# Patient Record
Sex: FEMALE | Race: WHITE | NOT HISPANIC OR LATINO | URBAN - METROPOLITAN AREA
[De-identification: names, ages, dates, MRNs, and addresses within clinical notes are randomized per-mention and may not be internally consistent; named-entity substitution may affect disease eponyms.]

---

## 2018-10-24 ENCOUNTER — OFFICE VISIT - HEALTHEAST (OUTPATIENT)
Dept: FAMILY MEDICINE | Facility: CLINIC | Age: 43
End: 2018-10-24

## 2018-10-24 DIAGNOSIS — R68.89 FLU-LIKE SYMPTOMS: ICD-10-CM

## 2018-10-24 LAB
DEPRECATED S PYO AG THROAT QL EIA: NORMAL
FLUAV AG SPEC QL IA: NORMAL
FLUBV AG SPEC QL IA: NORMAL

## 2018-10-24 RX ORDER — ALBUTEROL SULFATE 90 UG/1
2 AEROSOL, METERED RESPIRATORY (INHALATION) EVERY 6 HOURS PRN
Qty: 1 EACH | Refills: 0 | Status: SHIPPED | OUTPATIENT
Start: 2018-10-24

## 2018-10-25 LAB — GROUP A STREP BY PCR: NORMAL

## 2018-10-26 ENCOUNTER — OFFICE VISIT - HEALTHEAST (OUTPATIENT)
Dept: FAMILY MEDICINE | Facility: CLINIC | Age: 43
End: 2018-10-26

## 2018-10-26 DIAGNOSIS — J01.00 ACUTE MAXILLARY SINUSITIS, RECURRENCE NOT SPECIFIED: ICD-10-CM

## 2021-06-02 VITALS — WEIGHT: 175 LBS

## 2021-06-02 VITALS — WEIGHT: 183 LBS

## 2021-06-21 NOTE — PROGRESS NOTES
Subjective:      Patient ID: Adriana Paredes is a 42 y.o. female.    Chief Complaint:    HPI  Adriana Paredes is a 42 y.o. female who returns to clinic today from being seen 2 days previous.  Please see the previous note in epic.  In summary she had a viral illness where she had negative strep testing and negative influenza screening.  The symptoms and congestion have now coalesced to a more problematic facial and ear pain she is now complaining of pain in the bilateral ears as well as facial pain and pressure that is made worse with dependency.  She has also had some postnasal drainage down the back of the throat.  She has not had any epistaxis fever chills night sweats nausea or vomiting.  She has not tried treatment for this over-the-counter.      No past medical history on file.    No past surgical history on file.    No family history on file.    Social History   Substance Use Topics     Smoking status: Never Smoker     Smokeless tobacco: Never Used     Alcohol use Not on file       Review of Systems  As above in [facility], otherwise negative.    Objective:     /65  Pulse 78  Temp 98  F (36.7  C) (Oral)   Wt 175 lb (79.4 kg)  SpO2 98%  Breastfeeding? No    Physical Exam  General: Patient is resting comfortably no acute distress is afebrile  HEENT: Head is normocephalic atraumatic   Frontal and maxillary sinuses are tender to percussion  eyes are PERRL   EOMI sclera anicteric   TMs are clear bilaterally  Throat is with mild posterior pharyngeal wall exudate but no erythema  No cervical lymphadenopathy present  Lungs: Clear to auscultation bilaterally  Heart: Regular rate and rhythm  Skin: Without rash non-diaphoretic      Assessment:     Procedures    The encounter diagnosis was Acute maxillary sinusitis, recurrence not specified.    Plan:     1. Acute maxillary sinusitis, recurrence not specified  doxycycline (MONODOX) 100 MG capsule    fluticasone (FLONASE ALLERGY RELIEF) 50 mcg/actuation nasal spray          Over-the-counter nasal steroid spray, follow packaging directions  Over-the-counter Mucinex, follow packaging directions  Hot packs 3 times per day to the forehead and face over the tender sinuses  Distal saline salt water or saline irrigation with Alda Philippe  Antibiotic as written below.  Risks and benefits of the medication were gone over.  Indication for return to see urgent care or family practice provider for reevaluation and treatment.    As a result of our visit today, here are the action plans for you:    1. Medication(s) to stop: There are no discontinued medications.    2. Medication(s) to start or change:   Medications Ordered   Medications     doxycycline (MONODOX) 100 MG capsule     Sig: Take 1 capsule (100 mg total) by mouth 2 (two) times a day for 10 days.     Dispense:  20 capsule     Refill:  0     fluticasone (FLONASE ALLERGY RELIEF) 50 mcg/actuation nasal spray     Si spray into each nostril daily.     Dispense:  16 g     Refill:  0

## 2021-06-26 NOTE — PROGRESS NOTES
Progress Notes by Soham Joseph PA-C at 10/24/2018  3:00 PM     Author: Soham Joseph PA-C Service: -- Author Type: Physician Assistant    Filed: 10/24/2018  4:02 PM Encounter Date: 10/24/2018 Status: Signed    : Soham Joseph PA-C (Physician Assistant)       Subjective:      Patient ID: Adriana Dawkins is a 42 y.o. female.    Chief Complaint:    HPI  Adriana Dawkins is a 42 y.o. female who presents today complaining of 1 day acute onset of Influenza like illness symptoms to include fever, dry nonproductive cough, sore throat, odynophagia, rhinorrhea, myalgias, arthralgias, headache and fatigue.      She states she had acute onset of all the above symptoms.    She has had a seasonal influenza immunization on 10/1/2018.    Last dose of antipyretic.  None.  Temperature in the office is currently 98.0    Anorexia: NO    She is taking fluids and is micturating.    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      No past medical history on file.    No past surgical history on file.    No family history on file.    Social History   Substance Use Topics   ? Smoking status: Never Smoker   ? Smokeless tobacco: Never Used   ? Alcohol use None       Review of Systems  As above in HPI, otherwise negative.    Objective:     /78 (Patient Site: Right Arm, Patient Position: Sitting, Cuff Size: Adult Regular)  Pulse 63  Temp 98  F (36.7  C) (Oral)   Resp 16  Wt 183 lb (83 kg)  SpO2 98%    Physical Exam  General: Patient is resting comfortably no acute distress is afebrile  HEENT: Head is normocephalic atraumatic   eyes are PERRL EOMI sclera anicteric   TMs are clear bilaterally  Throat is with mild pharyngeal wall erythema and no exudate  No cervical lymphadenopathy present  LUNGS: Clear to auscultation bilaterally  HEART: Regular rate and rhythm  Skin: Without rash non-diaphoretic    Lab:  Recent Results (from the past 24 hour(s))   Influenza A/B Rapid Test- Nasal Swab   Result Value Ref  Range    Influenza  A, Rapid Antigen No Influenza A antigen detected No Influenza A antigen detected    Influenza B, Rapid Antigen No Influenza B antigen detected No Influenza B antigen detected   Rapid Strep A Screen-Throat   Result Value Ref Range    Rapid Strep A Antigen No Group A Strep detected, presumptive negative No Group A Strep detected, presumptive negative       Assessment:     Procedures    The encounter diagnosis was Flu-like symptoms.    Plan:     1. Flu-like symptoms  levothyroxine (SYNTHROID, LEVOTHROID) 37.5 mcg    Influenza A/B Rapid Test- Nasal Swab    Rapid Strep A Screen-Throat    Group A Strep, RNA Direct Detection, Throat    albuterol (PROAIR HFA;PROVENTIL HFA;VENTOLIN HFA) 90 mcg/actuation inhaler    inhalational spacing device Spcr    benzocaine-menthol (CEPACOL) 15-3.6 mg         Patient Instructions     Your rapid influenza test came back negative for flu. You are contagious until your fever is gone for 24 hours. Maintain good hand hygiene, cover your cough, and limit contact to prevent spreading the illness. Symptoms typically last 1-2 weeks.    Symptom management:  - Drink plenty of fluids and allow for plenty of rest  - Use tylenol or ibuprofen every 4-6 hours for fever/discomfort    Reasons to be seen immediately for re-evaluation:  - Have trouble breathing or are short of breath  - Feel pain or pressure in your chest or belly  - Get suddenly dizzy  - Feel confused  - Have severe vomiting    If no symptom improvement in 1 week, follow-up with your primary care provider.    As a result of our visit today, here are the action plans for you:    1. Medication(s) to stop: There are no discontinued medications.    2. Medication(s) to start or change:   Medications Ordered   Medications   ? albuterol (PROAIR HFA;PROVENTIL HFA;VENTOLIN HFA) 90 mcg/actuation inhaler     Sig: Inhale 2 puffs every 6 (six) hours as needed for wheezing. Use with spacer.     Dispense:  1 each     Refill:  0     May  substitute the equivalent medication per insurance preference.   ? benzocaine-menthol (CEPACOL) 15-3.6 mg     Sig: Take 1 lozenge by mouth every hour as needed.     Dispense:  30 lozenge     Refill:  0   ? inhalational spacing device Spcr     Sig: Use with MDI. Pharmacist advised use     Dispense:  1 each     Refill:  0       3. Other instructions: Yes         Viral Upper Respiratory Illness with Wheezing (Adult)  You have a viral upper respiratory illness (URI), which is another term for the common cold. When the infection causes a lot of irritation, the air passages can go into spasm. This causes wheezing and shortness of breath.    This illness is contagious during the first few days. It is spread through the air by coughing and sneezing. It may also be spread by direct contact. This could be by touching the sick person and then touching your own eyes, nose, or mouth. Frequent handwashing will decrease the risk.  Most viral illnesses go away within 7 to 10 days with rest and simple home remedies. Sometimes the illness may last for several weeks. Antibiotics will not kill a virus, and they are generally not prescribed for this condition.  Home care    If symptoms are severe, rest at home for the first 2 to 3 days. When you resume activity, don't let yourself get too tired.    Stay away from cigarette smoke.    You may use acetaminophen or ibuprofen to control pain and fever, unless another medicine was prescribed. If you have chronic liver or kidney disease, have ever had a stomach ulcer or gastrointestinal bleeding, or are taking blood-thinning medicines, talk with your healthcare provider before using these medicines. Aspirin should never be given to anyone under 18 years of age who is ill with a viral infection or fever. It may cause severe liver or brain damage.    Your appetite may be poor, so a light diet is fine. Avoid dehydration by drinking 6 to 8 glasses of fluids per day (water, soft drinks, juices,  tea, or soup). Extra fluids will help loosen secretions in the nose and lungs.    Over-the-counter cold medicines will not shorten the length of time youre sick, but they may be helpful for the following symptoms: cough, sore throat, and nasal and sinus congestion. Don't use decongestants if you have high blood pressure.  Follow-up care  Follow up with your healthcare provider, or as advised.  When to seek medical advice  Call your healthcare provider right away if any of these occur:    Cough with lots of colored sputum (mucus)    Severe headache; face, neck, or ear pain    Difficulty swallowing due to throat pain    Fever of 100.4 F (38 C) or higher, or as directed by your healthcare provider  Call 911  Call 911 if any of these occur:    Chest pain, shortness of breath, worsening wheezing, or difficulty breathing    Coughing up blood    Can't swallow because of throat pain  Date Last Reviewed: 9/13/2015 2000-2017 [employer]. [address] [place] [address], [place] [address]. All rights reserved. This information is not intended as a substitute for professional medical care. Always follow your healthcare professional's instructions.